# Patient Record
Sex: FEMALE | URBAN - METROPOLITAN AREA
[De-identification: names, ages, dates, MRNs, and addresses within clinical notes are randomized per-mention and may not be internally consistent; named-entity substitution may affect disease eponyms.]

---

## 2024-01-10 ENCOUNTER — APPOINTMENT (RX ONLY)
Dept: URBAN - METROPOLITAN AREA CLINIC 236 | Facility: CLINIC | Age: 69
Setting detail: DERMATOLOGY
End: 2024-01-10

## 2024-01-10 DIAGNOSIS — D22 MELANOCYTIC NEVI: ICD-10-CM

## 2024-01-10 DIAGNOSIS — L81.4 OTHER MELANIN HYPERPIGMENTATION: ICD-10-CM

## 2024-01-10 DIAGNOSIS — D18.0 HEMANGIOMA: ICD-10-CM

## 2024-01-10 DIAGNOSIS — L21.8 OTHER SEBORRHEIC DERMATITIS: ICD-10-CM | Status: INADEQUATELY CONTROLLED

## 2024-01-10 DIAGNOSIS — L73.9 FOLLICULAR DISORDER, UNSPECIFIED: ICD-10-CM

## 2024-01-10 DIAGNOSIS — L57.8 OTHER SKIN CHANGES DUE TO CHRONIC EXPOSURE TO NONIONIZING RADIATION: ICD-10-CM

## 2024-01-10 DIAGNOSIS — L82.1 OTHER SEBORRHEIC KERATOSIS: ICD-10-CM

## 2024-01-10 DIAGNOSIS — L57.0 ACTINIC KERATOSIS: ICD-10-CM | Status: INADEQUATELY CONTROLLED

## 2024-01-10 DIAGNOSIS — L663 OTHER SPECIFIED DISEASES OF HAIR AND HAIR FOLLICLES: ICD-10-CM

## 2024-01-10 DIAGNOSIS — L738 OTHER SPECIFIED DISEASES OF HAIR AND HAIR FOLLICLES: ICD-10-CM

## 2024-01-10 PROBLEM — L02.12 FURUNCLE OF NECK: Status: ACTIVE | Noted: 2024-01-10

## 2024-01-10 PROBLEM — D18.01 HEMANGIOMA OF SKIN AND SUBCUTANEOUS TISSUE: Status: ACTIVE | Noted: 2024-01-10

## 2024-01-10 PROBLEM — D22.5 MELANOCYTIC NEVI OF TRUNK: Status: ACTIVE | Noted: 2024-01-10

## 2024-01-10 PROCEDURE — ? TREATMENT REGIMEN

## 2024-01-10 PROCEDURE — 99204 OFFICE O/P NEW MOD 45 MIN: CPT | Mod: 25

## 2024-01-10 PROCEDURE — ? LIQUID NITROGEN

## 2024-01-10 PROCEDURE — ? PRESCRIPTION MEDICATION MANAGEMENT

## 2024-01-10 PROCEDURE — ? COUNSELING

## 2024-01-10 PROCEDURE — 17110 DESTRUCTION B9 LES UP TO 14: CPT

## 2024-01-10 PROCEDURE — ? FULL BODY SKIN EXAM

## 2024-01-10 PROCEDURE — ? PRESCRIPTION

## 2024-01-10 PROCEDURE — ? MDM - TREATMENT GOALS

## 2024-01-10 RX ORDER — AMMONIUM LACTATE 12 G/100G
LOTION TOPICAL QD
Qty: 400 | Refills: 3 | Status: ERX | COMMUNITY
Start: 2024-01-10

## 2024-01-10 RX ORDER — KETOCONAZOLE 20 MG/ML
SHAMPOO, SUSPENSION TOPICAL
Qty: 120 | Refills: 11 | Status: ERX | COMMUNITY
Start: 2024-01-10

## 2024-01-10 RX ADMIN — AMMONIUM LACTATE: 12 LOTION TOPICAL at 00:00

## 2024-01-10 RX ADMIN — KETOCONAZOLE: 20 SHAMPOO, SUSPENSION TOPICAL at 00:00

## 2024-01-10 ASSESSMENT — LOCATION SIMPLE DESCRIPTION DERM
LOCATION SIMPLE: LEFT ANTERIOR NECK
LOCATION SIMPLE: LEFT UPPER BACK
LOCATION SIMPLE: UPPER BACK
LOCATION SIMPLE: POSTERIOR SCALP
LOCATION SIMPLE: RIGHT UPPER BACK
LOCATION SIMPLE: RIGHT LOWER BACK

## 2024-01-10 ASSESSMENT — LOCATION ZONE DERM
LOCATION ZONE: NECK
LOCATION ZONE: SCALP
LOCATION ZONE: TRUNK

## 2024-01-10 ASSESSMENT — LOCATION DETAILED DESCRIPTION DERM
LOCATION DETAILED: SUPERIOR THORACIC SPINE
LOCATION DETAILED: INFERIOR THORACIC SPINE
LOCATION DETAILED: RIGHT INFERIOR MEDIAL UPPER BACK
LOCATION DETAILED: RIGHT SUPERIOR MEDIAL MIDBACK
LOCATION DETAILED: LEFT INFERIOR ANTERIOR NECK
LOCATION DETAILED: POSTERIOR MID-PARIETAL SCALP
LOCATION DETAILED: LEFT MID-UPPER BACK

## 2024-01-10 NOTE — PROCEDURE: LIQUID NITROGEN
Post-Care Instructions: I reviewed with the patient in detail post-care instructions. Patient is to wear sunprotection, and avoid picking at any of the treated lesions. Pt may apply Vaseline to crusted or scabbing areas.
Spray Paint Text: The liquid nitrogen was applied to the skin utilizing a spray paint frosting technique.
Include Z78.9 (Other Specified Conditions Influencing Health Status) As An Associated Diagnosis?: No
Detail Level: Detailed
Show Spray Paint Technique Variable?: Yes
Consent: The patient's consent was obtained including but not limited to risks of crusting, scabbing, blistering, scarring, darker or lighter pigmentary change, recurrence, incomplete removal and infection.
Medical Necessity Information: It is in your best interest to select a reason for this procedure from the list below. All of these items fulfill various CMS LCD requirements except the new and changing color options.
Medical Necessity Clause: This procedure was medically necessary because the lesions that were treated were:

## 2025-02-27 ENCOUNTER — APPOINTMENT (OUTPATIENT)
Dept: URBAN - METROPOLITAN AREA CLINIC 281 | Facility: CLINIC | Age: 70
Setting detail: DERMATOLOGY
End: 2025-02-27

## 2025-02-27 DIAGNOSIS — Z41.9 ENCOUNTER FOR PROCEDURE FOR PURPOSES OTHER THAN REMEDYING HEALTH STATE, UNSPECIFIED: ICD-10-CM

## 2025-02-27 DIAGNOSIS — D22 MELANOCYTIC NEVI: ICD-10-CM

## 2025-02-27 DIAGNOSIS — Z71.89 OTHER SPECIFIED COUNSELING: ICD-10-CM

## 2025-02-27 DIAGNOSIS — L82.1 OTHER SEBORRHEIC KERATOSIS: ICD-10-CM

## 2025-02-27 DIAGNOSIS — L81.4 OTHER MELANIN HYPERPIGMENTATION: ICD-10-CM

## 2025-02-27 DIAGNOSIS — I87.2 VENOUS INSUFFICIENCY (CHRONIC) (PERIPHERAL): ICD-10-CM | Status: INADEQUATELY CONTROLLED

## 2025-02-27 DIAGNOSIS — L21.8 OTHER SEBORRHEIC DERMATITIS: ICD-10-CM | Status: WELL CONTROLLED

## 2025-02-27 DIAGNOSIS — D18.0 HEMANGIOMA: ICD-10-CM

## 2025-02-27 DIAGNOSIS — L57.0 ACTINIC KERATOSIS: ICD-10-CM

## 2025-02-27 PROBLEM — D22.5 MELANOCYTIC NEVI OF TRUNK: Status: ACTIVE | Noted: 2025-02-27

## 2025-02-27 PROBLEM — D18.01 HEMANGIOMA OF SKIN AND SUBCUTANEOUS TISSUE: Status: ACTIVE | Noted: 2025-02-27

## 2025-02-27 PROCEDURE — ? LIQUID NITROGEN

## 2025-02-27 PROCEDURE — 17003 DESTRUCT PREMALG LES 2-14: CPT

## 2025-02-27 PROCEDURE — ? PRESCRIPTION

## 2025-02-27 PROCEDURE — ? COSMETIC CONSULTATION: GENERAL

## 2025-02-27 PROCEDURE — 17000 DESTRUCT PREMALG LESION: CPT

## 2025-02-27 PROCEDURE — ? TREATMENT REGIMEN

## 2025-02-27 PROCEDURE — ? FULL BODY SKIN EXAM

## 2025-02-27 PROCEDURE — ? COUNSELING

## 2025-02-27 PROCEDURE — ? MDM - TREATMENT GOALS

## 2025-02-27 PROCEDURE — 99214 OFFICE O/P EST MOD 30 MIN: CPT | Mod: 25

## 2025-02-27 PROCEDURE — ? PRESCRIPTION MEDICATION MANAGEMENT

## 2025-02-27 RX ORDER — KETOCONAZOLE 20 MG/ML
SHAMPOO, SUSPENSION TOPICAL
Qty: 120 | Refills: 11 | Status: ERX

## 2025-02-27 RX ORDER — AMMONIUM LACTATE 12 G/100G
LOTION TOPICAL QD
Qty: 400 | Refills: 3 | Status: ERX

## 2025-02-27 RX ORDER — TRIAMCINOLONE ACETONIDE 1 MG/G
CREAM TOPICAL BID
Qty: 453.6 | Refills: 3 | Status: ERX | COMMUNITY
Start: 2025-02-27

## 2025-02-27 RX ADMIN — TRIAMCINOLONE ACETONIDE: 1 CREAM TOPICAL at 00:00

## 2025-02-27 ASSESSMENT — LOCATION SIMPLE DESCRIPTION DERM
LOCATION SIMPLE: LEFT PRETIBIAL REGION
LOCATION SIMPLE: CHEST
LOCATION SIMPLE: NOSE
LOCATION SIMPLE: INFERIOR FOREHEAD
LOCATION SIMPLE: POSTERIOR SCALP
LOCATION SIMPLE: RIGHT PRETIBIAL REGION
LOCATION SIMPLE: RIGHT UPPER BACK
LOCATION SIMPLE: UPPER BACK
LOCATION SIMPLE: LEFT UPPER BACK

## 2025-02-27 ASSESSMENT — LOCATION DETAILED DESCRIPTION DERM
LOCATION DETAILED: NASAL ROOT
LOCATION DETAILED: LEFT DISTAL PRETIBIAL REGION
LOCATION DETAILED: POSTERIOR MID-PARIETAL SCALP
LOCATION DETAILED: RIGHT MID-UPPER BACK
LOCATION DETAILED: INFERIOR MID FOREHEAD
LOCATION DETAILED: LEFT SUPERIOR UPPER BACK
LOCATION DETAILED: MIDDLE STERNUM
LOCATION DETAILED: INFERIOR THORACIC SPINE
LOCATION DETAILED: RIGHT PROXIMAL PRETIBIAL REGION

## 2025-02-27 ASSESSMENT — LOCATION ZONE DERM
LOCATION ZONE: SCALP
LOCATION ZONE: FACE
LOCATION ZONE: LEG
LOCATION ZONE: NOSE
LOCATION ZONE: TRUNK

## 2025-02-27 NOTE — PROCEDURE: COUNSELING
Detail Level: Generalized
Sunscreen Recommendations: Recommended a mineral-based sunscreen containing zinc and/or titanium, with SPF of 30 or higher to use daily in sun-exposed areas of the skin.  Sunscreen needs to be reapplied every 2-3 hours especially if staying out in the sun.
Detail Level: Zone
Detail Level: Detailed
Prescription Strength Graduated Compression Stockings Recommendations: The patient was counseled that prescription strength graduated compression stockings should be worn for all waking hours. They will follow up with a venous specialist to monitor graduated compression stocking usage and their symptoms.

## 2025-02-27 NOTE — PROCEDURE: PRESCRIPTION MEDICATION MANAGEMENT
Detail Level: Zone
Render In Strict Bullet Format?: No
Continue Regimen: ammonium lactate 12 % lotion QD\\nQuantity: 400.0 g  Days Supply: 30\\nSig: Apply to body on wet skin QD after bathing
Initiate Treatment: triamcinolone acetonide 0.1 % topical cream BID\\nQuantity: 453.6 g  Days Supply: 30\\nSig: Apply twice daily to rash on legs up to 2 weeks/month as needed.